# Patient Record
(demographics unavailable — no encounter records)

---

## 2025-01-24 NOTE — REVIEW OF SYSTEMS
[Negative] : Heme/Lymph [Itching] : itching [Fever] : no fever [Chills] : no chills [Feeling Poorly] : not feeling poorly [Feeling Tired] : not feeling tired [Recent Weight Gain (___ Lbs)] : no recent weight gain [Recent Weight Loss (___ Lbs)] : no recent weight loss [Sore Throat] : no sore throat [Hoarseness] : no hoarseness [Chest Pain] : no chest pain [Palpitations] : no palpitations [Leg Claudication] : no intermittent leg claudication [Lower Ext Edema] : no extremity edema [Shortness Of Breath] : no shortness of breath [Wheezing] : no wheezing [Cough] : no cough [SOB on Exertion] : no shortness of breath during exertion [Abdominal Pain] : no abdominal pain [Vomiting] : no vomiting [Constipation] : no constipation [Diarrhea] : no diarrhea [Heartburn] : no heartburn [Melena] : no melena [Dysuria] : no dysuria [Incontinence] : no incontinence [Hesitancy] : no urinary hesitancy [Nocturia] : no nocturia [Genital Lesion] : no genital lesions [Testicular Pain] : no testicular pain [Skin Lesions] : no skin lesions [Skin Wound] : no skin wound [Confused] : no confusion [Convulsions] : no convulsions [Dizziness] : no dizziness [Fainting] : no fainting [Suicidal] : not suicidal [Sleep Disturbances] : no sleep disturbances [Anxiety] : no anxiety [Depression] : no depression [Easy Bleeding] : no tendency for easy bleeding [Easy Bruising] : no tendency for easy bruising [de-identified] : Skin tag

## 2025-01-24 NOTE — ASSESSMENT
[FreeTextEntry1] : Some rectal bleeding seen q 2 weeks. Has moderate sized external hemorrhoids causing itching and some discomfort.  Has tried topical creams without success. Recommend colonoscopy due to bleeding (BRB) to be safe. Treatment room external hemorrhoid excision is an option.  Patient interested. Given polyp information sheet and options explained.  Will consider.

## 2025-01-24 NOTE — HISTORY OF PRESENT ILLNESS
[FreeTextEntry1] : 38 y/o M with PMHx of hemorrhoids here for evaluation of perianal skintag. It bothers him with itching, irritation, and has discomfort from it. Denies pain or swelling after bowel movements. Sees some bright red blood when wiping. Bowel movements are daily x1. Stools are soft and with shape. Denies need to strain. Denies pain with BM.  Never had colonoscopy.   
Awake

## 2025-01-24 NOTE — PHYSICAL EXAM
[Abdomen Masses] : No abdominal masses [Abdomen Tenderness] : ~T No ~M abdominal tenderness [Excoriation] : no perianal excoriation [Fistula] : no fistulas [Wart] : no warts [Ulcer ___ cm] : no ulcers [Nonprolapsing] : a nonprolapsing (grade I) [Tender, Swollen] : nontender, non-swollen [Thrombosed] : that was not thrombosed [Skin Tags] : residual hemorrhoidal skin tags were noted [Normal] : was normal [None] : there was no rectal mass  [Stool Sample Taken] : no stool obtained on rectal exam [Gross Blood] : no gross blood [Normal Breath Sounds] : Normal breath sounds [Normal Heart Sounds] : normal heart sounds [2+] : left 2+ [No Rash or Lesion] : No rash or lesion [Alert] : alert [Oriented to Person] : oriented to person [Oriented to Place] : oriented to place [Oriented to Time] : oriented to time [Calm] : calm [de-identified] : benign, no scars [de-identified] : 3 moderate sized skin tags (R posterior R anterior, L lateral).  no skin breakdown noted.  digita:  tone wnl, no masses.  anoscopy (adult)  grade 1 internal hemorrhoids in the R Ant and R posterior quadrants, no fissure, no proctitis [de-identified] : NAD

## 2025-02-21 NOTE — PROCEDURE
[FreeTextEntry1] : after consent discussion and signing patient placed in prone position perianal area hair clipped buttocks taped laterally betdaine prep and sterile drape 1/4% marcaine total 18 ml used Right posterior, right anterior and left anterior skin tags excised in sequence Hemocautery used to detach after scalpel incision. 3-0 vicryl running suture closure for all wounds DSD after Tolerated well, Specimens sent separately for path

## 2025-02-21 NOTE — HISTORY OF PRESENT ILLNESS
[FreeTextEntry1] : 38 y/o M with PMHx of hemorrhoids was seen for evaluation of perianal skintag on 1/24/25. He was found to have moderated sized external hemorrhoids causing itching and some discomfort. Has tried topical creams without success. He wants them removed. He is here for removal of the hemorrhoids.  Denies fever or chills. Denies pain. Bowel movements are regular. No issues.

## 2025-02-21 NOTE — ASSESSMENT
[FreeTextEntry1] : Three skin tags excised. All suture closed Path pending but these were benign appearing skin tags Toradol 10 mg q 6 hrs for pain x 3 days and then tylenol 1000mg alternating with advil 600 mg.   Sitz baths and showers after BM's colace and fiber supplements advised RTC 1-2 weeks

## 2025-02-28 NOTE — REASON FOR VISIT
[Post Op: _________] : a [unfilled] post op visit [FreeTextEntry1] : s/p hemorrhoidectomy [FreeTextEntry2] : 2/21/25

## 2025-02-28 NOTE — PHYSICAL EXAM
[de-identified] : all 3 wounds remain closed and none infected.  Has resolving right lateral thrombosed external hemorrhoid between the RA and RP skin tag excisions. On itw way down.  No digital or anoscopy

## 2025-02-28 NOTE — PHYSICAL EXAM
[de-identified] : all 3 wounds remain closed and none infected.  Has resolving right lateral thrombosed external hemorrhoid between the RA and RP skin tag excisions. On itw way down.  No digital or anoscopy

## 2025-02-28 NOTE — HISTORY OF PRESENT ILLNESS
[FreeTextEntry1] : 38 y/o M with PMHx of hemorrhoids was seen for evaluation of perianal skintag on 1/24/25. He was found to have moderated sized external hemorrhoids causing itching and some discomfort. Has tried topical creams without success. He wanted them removed. He is now s/p excision of the external hemorrhoids in office on 2/21/25. He is here for post op visit.  Pain is controlled. Took Toradol first 3 days and taking ibuprofen and Tylenol. Very minimal bleeding on guaze. Doing sitz baths TID. Bowel movements are daily and stools are soft. Taking Colace 2-3 daily. Drinking lot of water.

## 2025-02-28 NOTE — ASSESSMENT
[FreeTextEntry1] : tag removal wounds look fine. Thrombosed hemorrhoid between the others was related to procedure clearly. On way down Will call for problems Sitz and showers Pain controllable.  Colace and increased water by mouth is helping him have regular BM's.